# Patient Record
Sex: MALE | Race: WHITE | URBAN - METROPOLITAN AREA
[De-identification: names, ages, dates, MRNs, and addresses within clinical notes are randomized per-mention and may not be internally consistent; named-entity substitution may affect disease eponyms.]

---

## 2018-06-09 ENCOUNTER — APPOINTMENT (OUTPATIENT)
Dept: GENERAL RADIOLOGY | Age: 53
DRG: 923 | End: 2018-06-09
Attending: PHYSICIAN ASSISTANT
Payer: COMMERCIAL

## 2018-06-09 ENCOUNTER — HOSPITAL ENCOUNTER (INPATIENT)
Age: 53
LOS: 1 days | Discharge: HOME | DRG: 923 | End: 2018-06-10
Attending: EMERGENCY MEDICINE | Admitting: INTERNAL MEDICINE
Payer: COMMERCIAL

## 2018-06-09 DIAGNOSIS — T67.5XXA HEAT EXHAUSTION, INITIAL ENCOUNTER: Primary | ICD-10-CM

## 2018-06-09 DIAGNOSIS — M62.82 NON-TRAUMATIC RHABDOMYOLYSIS: ICD-10-CM

## 2018-06-09 PROBLEM — D72.829 LEUKOCYTOSIS: Status: ACTIVE | Noted: 2018-06-09

## 2018-06-09 PROBLEM — N17.9 AKI (ACUTE KIDNEY INJURY) (HCC): Status: ACTIVE | Noted: 2018-06-09

## 2018-06-09 LAB
ANION GAP SERPL CALC-SCNC: 11 MMOL/L (ref 5–15)
BASOPHILS # BLD: 0 K/UL (ref 0–0.1)
BASOPHILS NFR BLD: 0 % (ref 0–1)
BUN SERPL-MCNC: 20 MG/DL (ref 6–20)
BUN/CREAT SERPL: 11 (ref 12–20)
CALCIUM SERPL-MCNC: 9.9 MG/DL (ref 8.5–10.1)
CHLORIDE SERPL-SCNC: 104 MMOL/L (ref 97–108)
CK SERPL-CCNC: 2165 U/L (ref 39–308)
CO2 SERPL-SCNC: 25 MMOL/L (ref 21–32)
CREAT SERPL-MCNC: 1.78 MG/DL (ref 0.7–1.3)
CREAT UR-MCNC: 493.69 MG/DL
DIFFERENTIAL METHOD BLD: ABNORMAL
EOSINOPHIL # BLD: 0 K/UL (ref 0–0.4)
EOSINOPHIL NFR BLD: 0 % (ref 0–7)
ERYTHROCYTE [DISTWIDTH] IN BLOOD BY AUTOMATED COUNT: 12.8 % (ref 11.5–14.5)
GLUCOSE SERPL-MCNC: 86 MG/DL (ref 65–100)
HCT VFR BLD AUTO: 41 % (ref 36.6–50.3)
HGB BLD-MCNC: 14.2 G/DL (ref 12.1–17)
IMM GRANULOCYTES # BLD: 0 K/UL (ref 0–0.04)
IMM GRANULOCYTES NFR BLD AUTO: 0 % (ref 0–0.5)
LYMPHOCYTES # BLD: 1.3 K/UL (ref 0.8–3.5)
LYMPHOCYTES NFR BLD: 10 % (ref 12–49)
MAGNESIUM SERPL-MCNC: 2 MG/DL (ref 1.6–2.4)
MCH RBC QN AUTO: 30.1 PG (ref 26–34)
MCHC RBC AUTO-ENTMCNC: 34.6 G/DL (ref 30–36.5)
MCV RBC AUTO: 87 FL (ref 80–99)
MONOCYTES # BLD: 1.2 K/UL (ref 0–1)
MONOCYTES NFR BLD: 9 % (ref 5–13)
NEUTS SEG # BLD: 10.3 K/UL (ref 1.8–8)
NEUTS SEG NFR BLD: 80 % (ref 32–75)
NRBC # BLD: 0 K/UL (ref 0–0.01)
NRBC BLD-RTO: 0 PER 100 WBC
PLATELET # BLD AUTO: 261 K/UL (ref 150–400)
PMV BLD AUTO: 10.8 FL (ref 8.9–12.9)
POTASSIUM SERPL-SCNC: 3.6 MMOL/L (ref 3.5–5.1)
PROT UR-MCNC: 52 MG/DL (ref 0–11.9)
PROT/CREAT UR-RTO: 0.1
RBC # BLD AUTO: 4.71 M/UL (ref 4.1–5.7)
SODIUM SERPL-SCNC: 140 MMOL/L (ref 136–145)
SODIUM UR-SCNC: 74 MMOL/L
WBC # BLD AUTO: 12.9 K/UL (ref 4.1–11.1)

## 2018-06-09 PROCEDURE — 80048 BASIC METABOLIC PNL TOTAL CA: CPT | Performed by: PHYSICIAN ASSISTANT

## 2018-06-09 PROCEDURE — 93005 ELECTROCARDIOGRAM TRACING: CPT

## 2018-06-09 PROCEDURE — 84300 ASSAY OF URINE SODIUM: CPT | Performed by: INTERNAL MEDICINE

## 2018-06-09 PROCEDURE — 36415 COLL VENOUS BLD VENIPUNCTURE: CPT | Performed by: PHYSICIAN ASSISTANT

## 2018-06-09 PROCEDURE — 71046 X-RAY EXAM CHEST 2 VIEWS: CPT

## 2018-06-09 PROCEDURE — 96361 HYDRATE IV INFUSION ADD-ON: CPT

## 2018-06-09 PROCEDURE — 74011250636 HC RX REV CODE- 250/636: Performed by: PHYSICIAN ASSISTANT

## 2018-06-09 PROCEDURE — 85025 COMPLETE CBC W/AUTO DIFF WBC: CPT | Performed by: PHYSICIAN ASSISTANT

## 2018-06-09 PROCEDURE — 99285 EMERGENCY DEPT VISIT HI MDM: CPT

## 2018-06-09 PROCEDURE — 74011250636 HC RX REV CODE- 250/636: Performed by: INTERNAL MEDICINE

## 2018-06-09 PROCEDURE — 82550 ASSAY OF CK (CPK): CPT | Performed by: PHYSICIAN ASSISTANT

## 2018-06-09 PROCEDURE — 65270000029 HC RM PRIVATE

## 2018-06-09 PROCEDURE — 83735 ASSAY OF MAGNESIUM: CPT | Performed by: PHYSICIAN ASSISTANT

## 2018-06-09 PROCEDURE — 84156 ASSAY OF PROTEIN URINE: CPT | Performed by: INTERNAL MEDICINE

## 2018-06-09 PROCEDURE — 96360 HYDRATION IV INFUSION INIT: CPT

## 2018-06-09 RX ORDER — ACETAMINOPHEN 325 MG/1
650 TABLET ORAL
Status: DISCONTINUED | OUTPATIENT
Start: 2018-06-09 | End: 2018-06-10 | Stop reason: HOSPADM

## 2018-06-09 RX ORDER — SODIUM CHLORIDE, SODIUM LACTATE, POTASSIUM CHLORIDE, CALCIUM CHLORIDE 600; 310; 30; 20 MG/100ML; MG/100ML; MG/100ML; MG/100ML
200 INJECTION, SOLUTION INTRAVENOUS CONTINUOUS
Status: DISCONTINUED | OUTPATIENT
Start: 2018-06-09 | End: 2018-06-10 | Stop reason: HOSPADM

## 2018-06-09 RX ORDER — CHOLECALCIFEROL (VITAMIN D3) 125 MCG
200 CAPSULE ORAL
COMMUNITY
End: 2018-06-10

## 2018-06-09 RX ORDER — ZOLPIDEM TARTRATE 5 MG/1
5 TABLET ORAL
Status: DISCONTINUED | OUTPATIENT
Start: 2018-06-09 | End: 2018-06-10 | Stop reason: HOSPADM

## 2018-06-09 RX ORDER — ONDANSETRON 2 MG/ML
4 INJECTION INTRAMUSCULAR; INTRAVENOUS
Status: DISCONTINUED | OUTPATIENT
Start: 2018-06-09 | End: 2018-06-10 | Stop reason: HOSPADM

## 2018-06-09 RX ORDER — SODIUM CHLORIDE 0.9 % (FLUSH) 0.9 %
5-10 SYRINGE (ML) INJECTION EVERY 8 HOURS
Status: DISCONTINUED | OUTPATIENT
Start: 2018-06-09 | End: 2018-06-10 | Stop reason: HOSPADM

## 2018-06-09 RX ORDER — HEPARIN SODIUM 5000 [USP'U]/ML
5000 INJECTION, SOLUTION INTRAVENOUS; SUBCUTANEOUS EVERY 8 HOURS
Status: DISCONTINUED | OUTPATIENT
Start: 2018-06-09 | End: 2018-06-10 | Stop reason: HOSPADM

## 2018-06-09 RX ORDER — NALOXONE HYDROCHLORIDE 0.4 MG/ML
0.4 INJECTION, SOLUTION INTRAMUSCULAR; INTRAVENOUS; SUBCUTANEOUS AS NEEDED
Status: DISCONTINUED | OUTPATIENT
Start: 2018-06-09 | End: 2018-06-10 | Stop reason: HOSPADM

## 2018-06-09 RX ORDER — HYDROCODONE BITARTRATE AND ACETAMINOPHEN 5; 325 MG/1; MG/1
1 TABLET ORAL
Status: DISCONTINUED | OUTPATIENT
Start: 2018-06-09 | End: 2018-06-10 | Stop reason: HOSPADM

## 2018-06-09 RX ORDER — SODIUM CHLORIDE 0.9 % (FLUSH) 0.9 %
5-10 SYRINGE (ML) INJECTION AS NEEDED
Status: DISCONTINUED | OUTPATIENT
Start: 2018-06-09 | End: 2018-06-10 | Stop reason: HOSPADM

## 2018-06-09 RX ADMIN — SODIUM CHLORIDE, SODIUM LACTATE, POTASSIUM CHLORIDE, AND CALCIUM CHLORIDE 200 ML/HR: 600; 310; 30; 20 INJECTION, SOLUTION INTRAVENOUS at 20:02

## 2018-06-09 RX ADMIN — SODIUM CHLORIDE 1000 ML: 9 INJECTION, SOLUTION INTRAVENOUS at 15:24

## 2018-06-09 RX ADMIN — Medication 10 ML: at 22:14

## 2018-06-09 RX ADMIN — HEPARIN SODIUM 5000 UNITS: 5000 INJECTION, SOLUTION INTRAVENOUS; SUBCUTANEOUS at 22:13

## 2018-06-09 RX ADMIN — SODIUM CHLORIDE 1000 ML: 9 INJECTION, SOLUTION INTRAVENOUS at 16:38

## 2018-06-09 NOTE — IP AVS SNAPSHOT
[address] 
[phone] Patient: Leydi Butterfield MRN: [id number] HLT:[id number] A check tara indicates which time of day the medication should be taken. My Medications START taking these medications Instructions Each Dose to Equal  
 Morning Noon Evening Bedtime  
 acetaminophen 325 mg tablet Commonly known as:  TYLENOL Your next dose is: Take as needed Take 2 Tabs by mouth every six (6) hours as needed. 650 mg  
    
   
   
   
  
  
STOP taking these medications ALEVE 220 mg Cap Generic drug:  naproxen sodium Where to Get Your Medications Information on where to get these meds will be given to you by the nurse or doctor. ! Ask your nurse or doctor about these medications  
  acetaminophen 325 mg tablet

## 2018-06-09 NOTE — H&P
[facility]    Hospitalist Admission Note      NAME:  Alirio Sample   :   1965   MRN:  [id number]     PCP:  Dena Wills MD     Date/Time:  2018 7:48 PM          Subjective:     CHIEF COMPLAINT: Chills, muscle ache    HISTORY OF PRESENT ILLNESS:     Mr. Hao Cruz is a 50 y.o.  male with a hx of provoked PE who presented to the Emergency Department complaining of shaking chills, muscle aches, exhaustion. Patient has been partaking in a karate summit this weekend and admits to poor hydration, long hours, and warm environment. He reports that at approx. 1300 today, developed fatigue, shaking chills, and muscle aches. In ED, noted to have HEATHER and elevated CK. We will admit for further management. Past Medical History:   Diagnosis Date    Ill-defined condition     Pulmonary embolism        Past Surgical History:   Procedure Laterality Date    HX ORTHOPAEDIC Left     ACL repair    HX ORTHOPAEDIC Right     meniscectomy       Social History   Substance Use Topics    Smoking status: Never Smoker    Smokeless tobacco: Never Used    Alcohol use Yes      Comment: occasionally        History reviewed. No pertinent family history. Family hx cannot be fully assessed, due to the admitting conditions    Allergies   Allergen Reactions    Morphine Other (comments)     Morphine was given at [facility] and patient reports unable to breath and the sensation of weight/pressure on chest. Reports that he is able to tolerate all other similar pain medications however. Prior to Admission medications    Medication Sig Start Date End Date Taking? Authorizing Provider   naproxen sodium (ALEVE) 220 mg cap Take 200 mg by mouth two (2) times daily as needed.    Yes Phys MD Arnie       Review of Systems:  (bold if positive, if negative)    Gen:  chills, fatigueEyes:  ENT:  CVS:  Pulm:  GI:    :    MS:  Skin:  Psych:  Endo:    Hem:  Renal:    Neuro:        Objective:      VITALS: Vital signs reviewed; most recent are:    Visit Vitals    /90    Pulse (!) 57    Temp 98.2 °F (36.8 °C)    Resp (!) 6    Ht 6' (1.829 m)    Wt 88 kg (194 lb)    SpO2 99%    BMI 26.31 kg/m2     SpO2 Readings from Last 6 Encounters:   06/09/18 99%        No intake or output data in the 24 hours ending 06/09/18 1948     Exam:     Physical Exam:    Gen:  Well-developed, well-nourished, in no acute distress  HEENT:  Pink conjunctivae, PERRL, hearing intact to voice, dry mucous membranes  Neck:  Supple, without masses, thyroid non-tender  Resp:  No accessory muscle use, clear breath sounds without wheezes rales or rhonchi  Card:  No murmurs, normal S1, S2 without thrills, bruits or peripheral edema  Abd:  Soft, non-tender, non-distended, normoactive bowel sounds are present, no palpable organomegaly and no detectable hernias  Lymph:  No cervical or inguinal adenopathy  Musc:  No cyanosis or clubbing  Skin:  No rashes or ulcers, skin turgor is good  Neuro:  Cranial nerves are grossly intact, no focal motor weakness, follows commands appropriately  Psych:  Good insight, oriented to person, place and time, alert     Labs:    Recent Labs      06/09/18   1520   WBC  12.9*   HGB  14.2   HCT  41.0   PLT  261     Recent Labs      06/09/18   1520   NA  140   K  3.6   CL  104   CO2  25   GLU  86   BUN  20   CREA  1.78*   CA  9.9   MG  2.0     No results found for: GLUCPOC  No results for input(s): PH, PCO2, PO2, HCO3, FIO2 in the last 72 hours. No results for input(s): INR in the last 72 hours. No lab exists for component: INREXT  All Micro Results     Procedure Component Value Units Date/Time    URINE CULTURE HOLD SAMPLE [[id number]]     Order Status:  Canceled Specimen:  Urine           I have reviewed previous records       Assessment and Plan: Active Problems:    Rhabdomyolysis / HEATHER (acute kidney injury) / Heat exhaustion.  CK likely not high enough to cause renal dysfunction and HEATHER is likely IVVD related. Admit to medicine. Aggressive IVF hydration with LR. Check urine lytes. Check renal US. Follow CK. If SCr worsens, would involve nephrology but can hold off for now). Leukocytosis. Likely stress response. Hold on any abx for now. Montior    Hx of provoked pulmonary embolism. Following R knee surgery. Anticoagulated for 6 mos.  Not currently on PE       Telemetry reviewed:   Sinus bradycardia    Risk of deterioration: medium      Total time spent with patient: [address] discussed with: Patient, Nursing Staff and >50% of time spent in counseling and coordination of care    Discussed:  Care Plan and D/C Planning       ___________________________________________________    Attending Physician: Nelia Rinne, DO

## 2018-06-09 NOTE — ED PROVIDER NOTES
HPI Comments: 49 y/o male with no significant PMHx, presenting with complaint of dehydration. The patient states that he was practicing martial arts this afternoon in full protective gear when he began to feel \"overheated,\" with associated fatigue, shortness of breath, generalized weakness, leg cramps, and shaking. The shortness of breath has resolved, but he continues to feel weak and shaky. He reports having a cold earlier this week, but states his cough and congestion have been well controlled with mary seltzer cold/sinus. His leg cramps are rated 1/10, described as aching; no aggravating or alleviating factors. He denies fever, chest pain, leg swelling, abdominal pain, N/V/D, dysuria, or syncope. The history is provided by the patient. No past medical history on file. No past surgical history on file. No family history on file. Social History     Social History    Marital status: UNKNOWN     Spouse name: N/A    Number of children: N/A    Years of education: N/A     Occupational History    Not on file. Social History Main Topics    Smoking status: Not on file    Smokeless tobacco: Not on file    Alcohol use Not on file    Drug use: Not on file    Sexual activity: Not on file     Other Topics Concern    Not on file     Social History Narrative         ALLERGIES: Morphine    Review of Systems   Constitutional: Positive for chills, diaphoresis and fatigue. Negative for fever. HENT: Positive for congestion. Respiratory: Positive for cough and shortness of breath. Cardiovascular: Negative for chest pain and leg swelling. Gastrointestinal: Negative for abdominal pain, diarrhea, nausea and vomiting. Genitourinary: Negative for dysuria, frequency and urgency. Musculoskeletal: Positive for myalgias (leg cramps). Skin: Negative for rash. Neurological: Positive for weakness (generalized) and light-headedness. Negative for syncope and numbness.    All other systems reviewed and are negative. Vitals:    06/09/18 1500   BP: 137/80   Pulse: 79   Resp: 22   Temp: 98.2 °F (36.8 °C)   SpO2: 100%   Weight: 88 kg (194 lb)   Height: 6' (1.829 m)            Physical Exam   Constitutional: He is oriented to person, place, and time. He appears well-developed and well-nourished. No distress. HENT:   Head: Normocephalic and atraumatic. Eyes: Conjunctivae and EOM are normal.   Neck: Normal range of motion. Neck supple. Cardiovascular: Normal rate, regular rhythm and normal heart sounds. Pulmonary/Chest: Effort normal and breath sounds normal. No respiratory distress. He has no wheezes. Abdominal: Soft. He exhibits no distension. There is no tenderness. Musculoskeletal: Normal range of motion. No muscular or bony tenderness of lower extremities. No appreciable swelling. Neurological: He is alert and oriented to person, place, and time. Skin: Skin is warm and dry. He is not diaphoretic. Nursing note and vitals reviewed. MDM  Number of Diagnoses or Management Options  Heat exhaustion, initial encounter: new and requires workup  Non-traumatic rhabdomyolysis: new and requires workup     Amount and/or Complexity of Data Reviewed  Clinical lab tests: ordered and reviewed  Tests in the radiology section of CPT®: ordered and reviewed  Discuss the patient with other providers: yes (Dr. Osman Sykes, ED attending)  Independent visualization of images, tracings, or specimens: yes (CXR)    Patient Progress  Patient progress: stable        ED Course       Procedures        49 y/o male with no significant PMHx, presenting with complaint of dehydration. History and exam concerning for heat exhaustion, pneumonia, electrolyte abnormality, anemia or arrhythmia. Patient has already received 1L fluid bolus, will give additional 1L. ED EKG interpretation:  Rhythm: normal sinus rhythm; and regular . Rate (approx.): 73;  Axis: normal; ST/T wave: normal.  Interpreted by Dr. Osman Sykes, 3:39 PM     CXR, read by radiology and independently visualized and interpreted by myself, reveals no evidence of pneumonia or other acute cardiopulmonary abnormalities. Labs significant for Cr 1.78 with GFR 41, CK 2165, leukocytosis of 12.9. Will give additional 1L fluid bolus (for total of 3L). Safe for discharge home, with instructions for prompt PCP follow up in 2 days for repeat labs. The patient was instructed to rest and continue to hydrate with clear fluids at home. Strict ED return precautions discussed and provided in writing at time of discharge. The patient verbalized understanding and agreement with this plan. 1845: On assessment at the request of the RN, patient shaking and states he is not feeling well. Review of orders and plan of care indicates rhabdo with 3 liters infused. Discussed admission to hospital with Dr. Maria Eugenia Medrano.   1905: Dr. Suraj Gallego will admit to hospital for rhabdo, increase creatinine, IVF and repeat labs in am.

## 2018-06-09 NOTE — IP AVS SNAPSHOT
[address] [facility] 
 
 
 [address] [facility] 100[address] [facility] 
[phone] Patient: Brandon Fernandez MRN: [id number] MSA: About your hospitalization You were admitted on:  2018 You last received care in the:  SF 4M POST SURG ORT 2 You were discharged on:  Cami 10, 2018 Why you were hospitalized Your primary diagnosis was:  Not on File Your diagnoses also included:  Rhabdomyolysis, Rolando (Acute Kidney Injury) (Hcc), Heat Exhaustion, Leukocytosis Follow-up Information Follow up With Details Comments Contact Info Adelina Alonzo MD Schedule an appointment as soon as possible for a visit in 2 days For repeat labs [address] [facility] [address] Adelina Cook 7 [address] 
[phone] [facility]  If symptoms worsen [address] 50 [facility] 
[phone] Adelina Alonzo MD In 1 week repeat urinalysis and renal function and creatine kinase (muscle enzyme) 59 [facility] [address] Adelina Cook 7 [address] 
[phone] Discharge Orders None A check tara indicates which time of day the medication should be taken. My Medications START taking these medications Instructions Each Dose to Equal  
 Morning Noon Evening Bedtime  
 acetaminophen 325 mg tablet Commonly known as:  TYLENOL Your next dose is: Take as needed Take 2 Tabs by mouth every six (6) hours as needed. 650 mg  
    
   
   
   
  
  
STOP taking these medications ALEVE 220 mg Cap Generic drug:  naproxen sodium Where to Get Your Medications Information on where to get these meds will be given to you by the nurse or doctor. ! Ask your nurse or doctor about these medications  
  acetaminophen 325 mg tablet Discharge Instructions HOSPITALIST DISCHARGE INSTRUCTIONS 
NAME: Brandon Fernandez :  1965 MRN:  [id number] Date/Time:  6/10/2018 8:10 AM 
 
ADMIT DATE: 6/9/2018 DISCHARGE DATE: 6/10/2018 PRINCIPAL DISCHARGE DIAGNOSES: 
Rhabdomyolysis (muscle tissue injury) with acute kidney injury (resolved) MEDICATIONS: 
· It is important that you take the medication exactly as they are prescribed. Note the changes and additions to your medications. Be sure you understand these changes before you are discharged today. · Keep your medication in the bottles provided by the pharmacist and keep a list of the medication names, dosages, and times to be taken in your wallet. · Do not take other medications without consulting your doctor. Pain Management: per above medications What to do at [facility] Recommended diet:  Regular Diet with plenty of fluids Recommended activity: Activity as tolerated (nothing exertional for 2 weeks) If you experience any of the following symptoms then please call your primary care physician or return to the emergency room if you cannot get hold of your doctor: 
Fever, chills, severe muscle aches, cramping, weakness, severe chest pain, shortness of breath, or other severe concerning symptoms that brought you to the hospital in the first place Follow Up: Follow-up Information Follow up With Details Comments Contact Info Yashira Fitzgerald MD Schedule an appointment as soon as possible for a visit in 2 days For repeat labs 59 [facility] [address] Dr Yashira Cook 7 [address] 
[phone] [facility]  If symptoms worsen [address] 50 [facility] 
[phone] Yashira Fitzgerald MD In 1 week repeat urinalysis 59 [facility] [address] Dr Yashira Cook 7 [address] 
[phone] Information obtained by : 
I understand that if any problems occur once I am at home I am to contact my physician. I understand and acknowledge receipt of the instructions indicated above. Physician's or R.N.'s Signature                                                                  Date/Time Patient or Representative Signature                                                          Date/Time Heat Exhaustion: Care Instructions Your Care Instructions Heat exhaustion occurs when you are hot, sweat a lot, and do not drink enough to replace the lost fluids. Heat exhaustion is not the same as heatstroke, which is much more serious. Heatstroke can lead to problems with many different organs and can be life-threatening. After medical care for heat exhaustion, you will need to limit your activities and take good care of your body while it recovers. Follow-up care is a key part of your treatment and safety. Be sure to make and go to all appointments, and call your doctor if you are having problems. It's also a good idea to know your test results and keep a list of the medicines you take. How can you care for yourself at home? · Reduce your activities, and get plenty of rest. Your doctor will give you instructions on when you can resume your normal schedule. · Stay in a cool room for at least the next 24 hours. · Drink rehydration drinks, juices, and water to replace fluids. Drinks such as sports drinks that contain electrolytes work best, because they have salt and minerals. You need salt and minerals as well as water. You are drinking enough fluids when your urine is normal in color (light yellow or clear), and you are urinating every 2 to 4 hours. If you have kidney, heart, or liver disease and have to limit fluids or salt, talk with your doctor before you increase your fluid or salt intake. · Avoid drinks that have caffeine or alcohol. To prevent heat exhaustion · Drink plenty of fluids, enough so that your urine is light yellow or clear like water. If you have kidney, heart, or liver disease and have to limit fluids, talk with your doctor before you increase the amount of fluids you drink. · Drink plenty of water before, during, and after you are active. This is very important when it is hot out and when you do intense exercise. · During hot weather, wear light-colored clothing that fits loosely and a hat with a brim to reflect the sun. · Limit or avoid strenuous activity during hot or humid weather, especially during the hottest part of the day (10 a.m. to 4 p.m.). Heat exhaustion and heatstroke usually develop when you are working or exercising in hot weather. Humidity makes hot weather even more dangerous. · Cars can get very hot inside. Open the windows or turn on the air conditioning before you get in and close the doors. · Try to stay cool during hot weather. If your home is not air-conditioned, seek an air-conditioned place. That could be in [employer], a neighborhood café, or a friend's home. Fountain yourself with a cool mist. Take a cool shower, bath, or sponge bath. · Be aware that some medicines, such as major tranquilizers, can raise the risk of heat exhaustion. Ask your doctor whether any medicine you take raises your chance of getting heat exhaustion. When should you call for help? Call 911 anytime you think you may need emergency care. For example, call if: 
? · You feel very hot and: 
¨ You have a seizure. ¨ You feel confused. ¨ Your skin is red, hot, and dry. ¨ You passed out (lost consciousness). ?Call your doctor now or seek immediate medical care if: 
? · You cannot keep fluids down. ? · After returning to your normal activities, you have symptoms of heat exhaustion, such as sweating a lot, fatigue, dizziness, or nausea. ?Watch closely for changes in your health, and be sure to contact your doctor if: 
? · You do not get better as expected. Where can you learn more? Go to http://kaushik-angela.info/. Enter S222 in the search box to learn more about \"Heat Exhaustion: Care Instructions. \" Current as of: March 20, 2017 Content Version: 11.4 © 9440-5850 [employer]. Care instructions adapted under license by [employer] (which disclaims liability or warranty for this information). If you have questions about a medical condition or this instruction, always ask your healthcare professional. [place] [address] any warranty or liability for your use of this information. [employer] Announcement We are excited to announce that we are making your provider's discharge notes available to you in [employer]. You will see these notes when they are completed and signed by the physician that discharged you from your recent hospital stay. If you have any questions or concerns about any information you see in [employer], please call the Health Information Department where you were seen or reach out to your Primary Care Provider for more information about your plan of care. Introducing [facility]! [facility] introduces [employer] patient portal. Now you can access parts of your medical record, email your doctor's office, and request medication refills online. 1. In your internet browser, go to [email or web address] 2. Click on the First Time User? Click Here link in the Sign In box. You will see the New Member Sign Up page. 3. Enter your [employer] Access Code exactly as it appears below. You will not need to use this code after youve completed the sign-up process. If you do not sign up before the expiration date, you must request a new code. · [employer] Access Code: [facility] Expires: 9/7/2018  2:53 PM 
 
 4. Enter the last four digits of your Social Security Number (xxxx) and Date of Birth (mm/dd/yyyy) as indicated and click Submit. You will be taken to the next sign-up page. 5. Create a [employer] ID. This will be your [employer] login ID and cannot be changed, so think of one that is secure and easy to remember. 6. Create a [employer] password. You can change your password at any time. 7. Enter your Password Reset Question and Answer. This can be used at a later time if you forget your password. 8. Enter your e-mail address. You will receive e-mail notification when new information is available in [address] 9. Click Sign Up. You can now view and download portions of your medical record. 10. Click the Download Summary menu link to download a portable copy of your medical information. If you have questions, please visit the Frequently Asked Questions section of the [employer] website. Remember, [employer] is NOT to be used for urgent needs. For medical emergencies, dial 911. Now available from your iPhone and Android! Introducing Ruddy Marr As a Jacquelyn Nolan patient, I wanted to make you aware of our electronic visit tool called Ruddy Marr. Jacquelyn Nolan 24/7 allows you to connect within minutes with a medical provider 24 hours a day, seven days a week via a mobile device or tablet or logging into a secure website from your computer. You can access Ruddy Frazierfin from anywhere in the [place]. A virtual visit might be right for you when you have a simple condition and feel like you just dont want to get out of bed, or cant get away from work for an appointment, when your regular Jacquelyn Nolan provider is not available (evenings, weekends or holidays), or when youre out of town and need minor care.   Electronic visits cost only $49 and if the Ruddy Marr provider determines a prescription is needed to treat your condition, one can be electronically transmitted to a nearby pharmacy*. Please take a moment to enroll today if you have not already done so. The enrollment process is free and takes just a few minutes. To enroll, please download the [employer] 24/7 katie to your tablet or phone, or visit [email or web address] to enroll on your computer. And, as an [address] [place] [address] patient with a [employer] account, the results of your visits will be scanned into your electronic medical record and your primary care provider will be able to view the scanned results. We urge you to continue to see your regular [employer] provider for your ongoing medical care. And while your primary care provider may not be the one available when you seek a [employer] virtual visit, the peace of mind you get from getting a real diagnosis real time can be priceless. For more information on Company[employer], view our Frequently Asked Questions (FAQs) at [email or web address]. Sincerely, 
 
Marian Low MD 
Chief Medical Officer [address] [place] [address] *:  certain medications cannot be prescribed via [employer] Unresulted Labs-Please follow up with your PCP about these lab tests Order Current Status EKG, 12 LEAD, INITIAL Preliminary result Providers Seen During Your Hospitalization Provider Specialty Primary office phone No Carrasco MD Emergency Medicine [phone] Erendira Infante DO Internal Medicine [phone] Your Primary Care Physician (PCP) Primary Care Physician Office Phone Office Fax Tamanna Oropeza, [address] [facility] [address] [phone] You are allergic to the following Allergen Reactions Morphine Other (comments) Morphine was given at [facility] [place] and patient reports unable to breath and the sensation of weight/pressure on chest. Reports that he is able to tolerate all other similar pain medications however. Recent Documentation Height Weight BMI Smoking Status 1.829 m 88 kg 26.31 kg/m2 Never Smoker Emergency Contacts Name Discharge Info Relation Home Work Mobile RicoDoris DISCHARGE CAREGIVER [3] Spouse [3] Patient Belongings The following personal items are in your possession at time of discharge: 
     Visual Aid: None Please provide this summary of care documentation to your next provider. Signatures-by signing, you are acknowledging that this After Visit Summary has been reviewed with you and you have received a copy. Patient Signature:  ____________________________________________________________ Date:  ____________________________________________________________  
  
[facility] Provider Signature:  ____________________________________________________________ Date:  ____________________________________________________________

## 2018-06-09 NOTE — ED NOTES
Bedside and Verbal shift change report given to Hood Covarrubias RN (oncoming nurse) by Gayatri Gonzalez RN (offgoing nurse). Report included the following information SBAR, Kardex, ED Summary, Procedure Summary, Intake/Output, MAR, Recent Results and Med Rec Status.

## 2018-06-09 NOTE — PROGRESS NOTES
[facility]: MED RECONCILIATION    Comments:   Patient reports no prescription medications, but does take Aleve PRN (last dose this morning)   Reviewed morphine allergy with patient. He reports that he was given morphine at [facility] some time ago and that he had difficulty breathing and the sensation of weight/pressure on his chest. States that he can tolerate \"all other\" similar pain medications however. Unable to confirm exactly which ones he has had. Information obtained from: patient    Significant PMH/Disease States:   Past Medical History:   Diagnosis Date    Ill-defined condition 2005    Pulmonary embolism     Chief Complaint for this Admission:   Chief Complaint   Patient presents with    Dehydration     Allergies: Morphine    Prior to Admission Medications:     Medication Documentation Review Audit       Reviewed by BRANDYN ChampionD (Pharmacist) on 06/09/18 at [address] Provider Last Dose Status Taking?      naproxen sodium (ALEVE) 220 mg cap Take 200 mg by mouth two (2) times daily as needed. Phys Arnie, MD 6/9/2018 0630 Active Yes                  Thank you for the consult,  Davian Fischer

## 2018-06-09 NOTE — ED NOTES
The patient was able to stand to void but appeared very shaky and complained of bilateral lower leg discomfort.

## 2018-06-10 ENCOUNTER — APPOINTMENT (OUTPATIENT)
Dept: ULTRASOUND IMAGING | Age: 53
DRG: 923 | End: 2018-06-10
Attending: INTERNAL MEDICINE
Payer: COMMERCIAL

## 2018-06-10 VITALS
HEIGHT: 72 IN | RESPIRATION RATE: 15 BRPM | TEMPERATURE: 98 F | SYSTOLIC BLOOD PRESSURE: 118 MMHG | BODY MASS INDEX: 26.28 KG/M2 | HEART RATE: 57 BPM | WEIGHT: 194 LBS | OXYGEN SATURATION: 96 % | DIASTOLIC BLOOD PRESSURE: 66 MMHG

## 2018-06-10 LAB
ALBUMIN SERPL-MCNC: 3.1 G/DL (ref 3.5–5)
ALBUMIN/GLOB SERPL: 1.2 {RATIO} (ref 1.1–2.2)
ALP SERPL-CCNC: 49 U/L (ref 45–117)
ALT SERPL-CCNC: 35 U/L (ref 12–78)
ANION GAP SERPL CALC-SCNC: 8 MMOL/L (ref 5–15)
AST SERPL-CCNC: 67 U/L (ref 15–37)
BASOPHILS # BLD: 0 K/UL (ref 0–0.1)
BASOPHILS NFR BLD: 0 % (ref 0–1)
BILIRUB DIRECT SERPL-MCNC: 0.2 MG/DL (ref 0–0.2)
BILIRUB SERPL-MCNC: 0.9 MG/DL (ref 0.2–1)
BUN SERPL-MCNC: 14 MG/DL (ref 6–20)
BUN/CREAT SERPL: 13 (ref 12–20)
CALCIUM SERPL-MCNC: 8.2 MG/DL (ref 8.5–10.1)
CHLORIDE SERPL-SCNC: 106 MMOL/L (ref 97–108)
CK SERPL-CCNC: 1812 U/L (ref 39–308)
CO2 SERPL-SCNC: 25 MMOL/L (ref 21–32)
CREAT SERPL-MCNC: 1.04 MG/DL (ref 0.7–1.3)
DIFFERENTIAL METHOD BLD: ABNORMAL
EOSINOPHIL # BLD: 0.1 K/UL (ref 0–0.4)
EOSINOPHIL NFR BLD: 1 % (ref 0–7)
ERYTHROCYTE [DISTWIDTH] IN BLOOD BY AUTOMATED COUNT: 13.1 % (ref 11.5–14.5)
GLOBULIN SER CALC-MCNC: 2.5 G/DL (ref 2–4)
GLUCOSE SERPL-MCNC: 91 MG/DL (ref 65–100)
HCT VFR BLD AUTO: 36.2 % (ref 36.6–50.3)
HGB BLD-MCNC: 12 G/DL (ref 12.1–17)
IMM GRANULOCYTES # BLD: 0 K/UL (ref 0–0.04)
IMM GRANULOCYTES NFR BLD AUTO: 0 % (ref 0–0.5)
LYMPHOCYTES # BLD: 2.1 K/UL (ref 0.8–3.5)
LYMPHOCYTES NFR BLD: 34 % (ref 12–49)
MAGNESIUM SERPL-MCNC: 2 MG/DL (ref 1.6–2.4)
MCH RBC QN AUTO: 29.9 PG (ref 26–34)
MCHC RBC AUTO-ENTMCNC: 33.1 G/DL (ref 30–36.5)
MCV RBC AUTO: 90 FL (ref 80–99)
MONOCYTES # BLD: 0.6 K/UL (ref 0–1)
MONOCYTES NFR BLD: 10 % (ref 5–13)
NEUTS SEG # BLD: 3.4 K/UL (ref 1.8–8)
NEUTS SEG NFR BLD: 55 % (ref 32–75)
NRBC # BLD: 0 K/UL (ref 0–0.01)
NRBC BLD-RTO: 0 PER 100 WBC
PHOSPHATE SERPL-MCNC: 3.8 MG/DL (ref 2.6–4.7)
PLATELET # BLD AUTO: 194 K/UL (ref 150–400)
PMV BLD AUTO: 10.7 FL (ref 8.9–12.9)
POTASSIUM SERPL-SCNC: 3.4 MMOL/L (ref 3.5–5.1)
PROT SERPL-MCNC: 5.6 G/DL (ref 6.4–8.2)
RBC # BLD AUTO: 4.02 M/UL (ref 4.1–5.7)
SODIUM SERPL-SCNC: 139 MMOL/L (ref 136–145)
WBC # BLD AUTO: 6.2 K/UL (ref 4.1–11.1)

## 2018-06-10 PROCEDURE — 74011250636 HC RX REV CODE- 250/636: Performed by: INTERNAL MEDICINE

## 2018-06-10 PROCEDURE — 80048 BASIC METABOLIC PNL TOTAL CA: CPT | Performed by: INTERNAL MEDICINE

## 2018-06-10 PROCEDURE — 84100 ASSAY OF PHOSPHORUS: CPT | Performed by: INTERNAL MEDICINE

## 2018-06-10 PROCEDURE — 74011250637 HC RX REV CODE- 250/637: Performed by: INTERNAL MEDICINE

## 2018-06-10 PROCEDURE — 82550 ASSAY OF CK (CPK): CPT | Performed by: INTERNAL MEDICINE

## 2018-06-10 PROCEDURE — 36415 COLL VENOUS BLD VENIPUNCTURE: CPT | Performed by: INTERNAL MEDICINE

## 2018-06-10 PROCEDURE — 76770 US EXAM ABDO BACK WALL COMP: CPT

## 2018-06-10 PROCEDURE — 80076 HEPATIC FUNCTION PANEL: CPT | Performed by: INTERNAL MEDICINE

## 2018-06-10 PROCEDURE — 85025 COMPLETE CBC W/AUTO DIFF WBC: CPT | Performed by: INTERNAL MEDICINE

## 2018-06-10 PROCEDURE — 83735 ASSAY OF MAGNESIUM: CPT | Performed by: INTERNAL MEDICINE

## 2018-06-10 RX ORDER — POTASSIUM CHLORIDE 750 MG/1
20 TABLET, FILM COATED, EXTENDED RELEASE ORAL
Status: COMPLETED | OUTPATIENT
Start: 2018-06-10 | End: 2018-06-10

## 2018-06-10 RX ORDER — ACETAMINOPHEN 325 MG/1
650 TABLET ORAL
Qty: 30 TAB | Refills: 0 | Status: SHIPPED | OUTPATIENT
Start: 2018-06-10

## 2018-06-10 RX ADMIN — POTASSIUM CHLORIDE 20 MEQ: 750 TABLET, FILM COATED, EXTENDED RELEASE ORAL at 08:48

## 2018-06-10 RX ADMIN — SODIUM CHLORIDE, SODIUM LACTATE, POTASSIUM CHLORIDE, AND CALCIUM CHLORIDE 200 ML/HR: 600; 310; 30; 20 INJECTION, SOLUTION INTRAVENOUS at 02:59

## 2018-06-10 NOTE — PROGRESS NOTES
I have reviewed discharge instructions with the patient and spouse. .  The patient and spouse verbalized understanding.

## 2018-06-10 NOTE — DISCHARGE SUMMARY
Physician Discharge Summary     Patient ID:  Yaya Ch  [id number]  50 y.o.  1965    Admit date: 6/9/2018    Discharge date: 6/10/2018    Admission Diagnoses: Rhabdomyolysis    Principal Discharge Diagnoses: HEATHER  rhabdomyolysis    OTHER PROBLEMS ADDRESSEDS  Principal Diagnosis <principal problem not specified>                                            Active Problems:    Rhabdomyolysis (6/9/2018)      HEATHER (acute kidney injury) ([facility] [address] (6/9/2018)      Heat exhaustion (6/9/2018)      Leukocytosis (6/9/2018)       Patient Active Problem List   Diagnosis Code    Rhabdomyolysis M62.82    HEATHER (acute kidney injury) ([facility] [address] N17.9    Heat exhaustion T67. 5XXA    Leukocytosis [id number]         Hospital Course:   Mr. Kathy Perez is a 51 yo WM otherwise healthy presenting with myalgia, weakness, lethargy, found to have rhabdomyolysis and HEATHER     Rhabdomyolysis / HEATHER (acute kidney injury) / Heat exhaustion: CK elevated on admission, improved at discharge. Cr improved quickly after aggressive IV hydration and normalized. Renal US normal, ruling out hydronephrosis. Pt had profound weakness on admission, now feeling much better, improved faster than expected, and ambulating without problem. Wishes to go home today and he is stable to do so. Recommend following up with PCP next week to repeat labs, and urine (proteinuria). Instructed to avoid NSAIDS and no exertional activities for 2 weeks    Leukocytosis: normalized after supportive care. No fevers or chills to suggest infection     Hx of provoked pulmonary embolism. Following R knee surgery. Anticoagulated for 6 mos. 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 Offered LE dopplers but pt declined. Very low suspicion of DVT. Return precautions and red flags reviewed        Pt discharged in improved and stable condition.      Procedures performed: see above    Imaging studies: CXR - no acute process    PCP: Fermin Shah MD    Consults: None    Discharge Exam:  Patient Vitals for the past 12 hrs:   Temp Pulse Resp BP SpO2   06/10/18 0721 98 °F (36.7 °C) (!) 57 15 118/66 96 %     GEN: NAD  CV: RRR, NO MRG  RESP: CTAB  MSK: no calf tenderness on palpation      Disposition: home    Patient Instructions:   Current Discharge Medication List      START taking these medications    Details   acetaminophen (TYLENOL) 325 mg tablet Take 2 Tabs by mouth every six (6) hours as needed. Qty: 30 Tab, Refills: 0         STOP taking these medications       naproxen sodium (ALEVE) 220 mg cap Comments:   Reason for Stopping:               Activity: See discharge instructions  Diet: See discharge instructions  Wound Care: See discharge instructions    Follow-up Information     Follow up With Details Comments [address] Randall Rincon MD Schedule an appointment as soon as possible for a visit in 2 days For repeat labs [address]  [phone]      [facility]  If symptoms worsen [address]    Amina Hope MD In 1 week repeat urinalysis and renal function and creatine kinase (muscle enzyme) [address]  [phone]            I spent 35 minutes on this discharge.     Signed:  Yumi Virgen MD  6/10/2018  8:13 AM

## 2018-06-10 NOTE — ROUTINE PROCESS
TRANSFER - OUT REPORT:    Verbal report given to [address] [facility] on Ervin Roldan  being transferred to  for routine progression of care       Report consisted of patients Situation, Background, Assessment and   Recommendations(SBAR). Information from the following report(s) SBAR, ED Summary, [facility] and Recent Results was reviewed with the receiving nurse. Opportunity for questions and clarification was provided.

## 2018-06-10 NOTE — DISCHARGE INSTRUCTIONS
HOSPITALIST DISCHARGE INSTRUCTIONS  NAME: Maria Dolores Marrero   :  1965   MRN:  [id number]     Date/Time:  6/10/2018 8:10 AM    ADMIT DATE: 2018     DISCHARGE DATE: 6/10/2018     PRINCIPAL DISCHARGE DIAGNOSES:  Rhabdomyolysis (muscle tissue injury) with acute kidney injury (resolved)    MEDICATIONS:  · It is important that you take the medication exactly as they are prescribed. Note the changes and additions to your medications. Be sure you understand these changes before you are discharged today. · Keep your medication in the bottles provided by the pharmacist and keep a list of the medication names, dosages, and times to be taken in your wallet. · Do not take other medications without consulting your doctor. Pain Management: per above medications    What to do at Home    Recommended diet:  Regular Diet with plenty of fluids    Recommended activity: Activity as tolerated (nothing exertional for 2 weeks)    If you experience any of the following symptoms then please call your primary care physician or return to the emergency room if you cannot get hold of your doctor:  Fever, chills, severe muscle aches, cramping, weakness, severe chest pain, shortness of breath, or other severe concerning symptoms that brought you to the hospital in the first place    Follow Up: Follow-up Information     Follow up With Details Comments [id number] Randall Rincon MD Schedule an appointment as soon as possible for a visit in 2 days For repeat labs [address] [facility]  [phone]      [facility] EM[facility]Y [facility]PT  If symptoms worsen [address]    Vignesh Spring MD In 1 week repeat urinalysis [address] [place] [address]  [phone]              Information obtained by :  I understand that if any problems occur once I am at home I am to contact my physician.     I understand and acknowledge receipt of the instructions indicated above. Physician's or R.N.'s Signature                                                                  Date/Time                                                                                                                                              Patient or Representative Signature                                                          Date/Time       Heat Exhaustion: Care Instructions  Your Care Instructions  Heat exhaustion occurs when you are hot, sweat a lot, and do not drink enough to replace the lost fluids. Heat exhaustion is not the same as heatstroke, which is much more serious. Heatstroke can lead to problems with many different organs and can be life-threatening. After medical care for heat exhaustion, you will need to limit your activities and take good care of your body while it recovers. Follow-up care is a key part of your treatment and safety. Be sure to make and go to all appointments, and call your doctor if you are having problems. It's also a good idea to know your test results and keep a list of the medicines you take. How can you care for yourself at home? · Reduce your activities, and get plenty of rest. Your doctor will give you instructions on when you can resume your normal schedule. · Stay in a cool room for at least the next 24 hours. · Drink rehydration drinks, juices, and water to replace fluids. Drinks such as sports drinks that contain electrolytes work best, because they have salt and minerals. You need salt and minerals as well as water. You are drinking enough fluids when your urine is normal in color (light yellow or clear), and you are urinating every 2 to 4 hours.  If you have kidney, heart, or liver disease and have to limit fluids or salt, talk with your doctor before you increase your fluid or salt intake. · Avoid drinks that have caffeine or alcohol. To prevent heat exhaustion  · Drink plenty of fluids, enough so that your urine is light yellow or clear like water. If you have kidney, heart, or liver disease and have to limit fluids, talk with your doctor before you increase the amount of fluids you drink. · Drink plenty of water before, during, and after you are active. This is very important when it is hot out and when you do intense exercise. · During hot weather, wear light-colored clothing that fits loosely and a hat with a brim to reflect the sun. · Limit or avoid strenuous activity during hot or humid weather, especially during the hottest part of the day (10 a.m. to 4 p.m.). Heat exhaustion and heatstroke usually develop when you are working or exercising in hot weather. Humidity makes hot weather even more dangerous. · Cars can get very hot inside. Open the windows or turn on the air conditioning before you get in and close the doors. · Try to stay cool during hot weather. If your home is not air-conditioned, seek an air-conditioned place. That could be in [employer], a neighborhood café, or a friend's home. [place] yourself with a cool mist. Take a cool shower, bath, or sponge bath. · Be aware that some medicines, such as major tranquilizers, can raise the risk of heat exhaustion. Ask your doctor whether any medicine you take raises your chance of getting heat exhaustion. When should you call for help? Call 911 anytime you think you may need emergency care. For example, call if:  ? · You feel very hot and:  ¨ You have a seizure. ¨ You feel confused. ¨ Your skin is red, hot, and dry. ¨ You passed out (lost consciousness). ?Call your doctor now or seek immediate medical care if:  ? · You cannot keep fluids down.    ? · After returning to your normal activities, you have symptoms of heat exhaustion, such as sweating a lot, fatigue, dizziness, or nausea. ? Watch closely for changes in your health, and be sure to contact your doctor if:  ? · You do not get better as expected. Where can you learn more? Go to http://kaushik-angela.info/. Enter S222 in the search box to learn more about \"Heat Exhaustion: Care Instructions. \"  Current as of: March 20, 2017  Content Version: 11.4  © 0340-2879 "[employer]". Care instructions adapted under license by [employer] (which disclaims liability or warranty for this information). If you have questions about a medical condition or this instruction, always ask your healthcare professional. [place] [address] any warranty or liability for your use of this information.

## 2018-06-11 LAB
ATRIAL RATE: 73 BPM
CALCULATED P AXIS, ECG09: 33 DEGREES
CALCULATED R AXIS, ECG10: 12 DEGREES
CALCULATED T AXIS, ECG11: 49 DEGREES
DIAGNOSIS, 93000: NORMAL
P-R INTERVAL, ECG05: 142 MS
Q-T INTERVAL, ECG07: 410 MS
QRS DURATION, ECG06: 86 MS
QTC CALCULATION (BEZET), ECG08: 451 MS
VENTRICULAR RATE, ECG03: 73 BPM

## 2022-03-18 PROBLEM — M62.82 RHABDOMYOLYSIS: Status: ACTIVE | Noted: 2018-06-09

## 2022-03-18 PROBLEM — T67.5XXA HEAT EXHAUSTION: Status: ACTIVE | Noted: 2018-06-09

## 2022-03-19 PROBLEM — D72.829 LEUKOCYTOSIS: Status: ACTIVE | Noted: 2018-06-09

## 2022-03-19 PROBLEM — N17.9 AKI (ACUTE KIDNEY INJURY) (HCC): Status: ACTIVE | Noted: 2018-06-09

## 2024-11-27 ENCOUNTER — HOSPITAL ENCOUNTER (EMERGENCY)
Facility: HOSPITAL | Age: 59
Discharge: HOME | End: 2024-11-27
Attending: EMERGENCY MEDICINE
Payer: COMMERCIAL

## 2024-11-27 ENCOUNTER — OFFICE VISIT (OUTPATIENT)
Age: 59
End: 2024-11-27

## 2024-11-27 ENCOUNTER — APPOINTMENT (OUTPATIENT)
Facility: HOSPITAL | Age: 59
End: 2024-11-27
Payer: COMMERCIAL

## 2024-11-27 VITALS
BODY MASS INDEX: 28.1 KG/M2 | TEMPERATURE: 97.6 F | OXYGEN SATURATION: 97 % | DIASTOLIC BLOOD PRESSURE: 98 MMHG | SYSTOLIC BLOOD PRESSURE: 136 MMHG | RESPIRATION RATE: 16 BRPM | WEIGHT: 207.45 LBS | HEIGHT: 72 IN | HEART RATE: 79 BPM

## 2024-11-27 VITALS
TEMPERATURE: 98.4 F | SYSTOLIC BLOOD PRESSURE: 117 MMHG | WEIGHT: 207 LBS | HEIGHT: 72 IN | HEART RATE: 96 BPM | BODY MASS INDEX: 28.04 KG/M2 | RESPIRATION RATE: 16 BRPM | DIASTOLIC BLOOD PRESSURE: 85 MMHG | OXYGEN SATURATION: 95 %

## 2024-11-27 DIAGNOSIS — S61.211A LACERATION OF LEFT INDEX FINGER WITHOUT FOREIGN BODY WITHOUT DAMAGE TO NAIL, INITIAL ENCOUNTER: Primary | ICD-10-CM

## 2024-11-27 DIAGNOSIS — S62.639B OPEN FRACTURE OF TUFT OF DISTAL PHALANX OF FINGER: ICD-10-CM

## 2024-11-27 DIAGNOSIS — S62.668B: Primary | ICD-10-CM

## 2024-11-27 PROCEDURE — 6360000002 HC RX W HCPCS: Performed by: PHYSICIAN ASSISTANT

## 2024-11-27 PROCEDURE — 99283 EMERGENCY DEPT VISIT LOW MDM: CPT

## 2024-11-27 PROCEDURE — 6370000000 HC RX 637 (ALT 250 FOR IP): Performed by: PHYSICIAN ASSISTANT

## 2024-11-27 PROCEDURE — 73140 X-RAY EXAM OF FINGER(S): CPT

## 2024-11-27 PROCEDURE — 12002 RPR S/N/AX/GEN/TRNK2.6-7.5CM: CPT

## 2024-11-27 RX ORDER — GINSENG 100 MG
CAPSULE ORAL
Status: COMPLETED | OUTPATIENT
Start: 2024-11-27 | End: 2024-11-27

## 2024-11-27 RX ORDER — LIDOCAINE HYDROCHLORIDE AND EPINEPHRINE 10; 10 MG/ML; UG/ML
20 INJECTION, SOLUTION INFILTRATION; PERINEURAL
Status: COMPLETED | OUTPATIENT
Start: 2024-11-27 | End: 2024-11-27

## 2024-11-27 RX ORDER — CEPHALEXIN 500 MG/1
500 CAPSULE ORAL 3 TIMES DAILY
Qty: 30 CAPSULE | Refills: 0 | Status: SHIPPED | OUTPATIENT
Start: 2024-11-27 | End: 2024-12-07

## 2024-11-27 RX ADMIN — CEPHALEXIN 500 MG: 250 CAPSULE ORAL at 19:12

## 2024-11-27 RX ADMIN — LIDOCAINE HYDROCHLORIDE,EPINEPHRINE BITARTRATE 20 ML: 10; .01 INJECTION, SOLUTION INFILTRATION; PERINEURAL at 18:41

## 2024-11-27 RX ADMIN — BACITRACIN 1 EACH: 500 OINTMENT TOPICAL at 18:42

## 2024-11-27 ASSESSMENT — PAIN - FUNCTIONAL ASSESSMENT
PAIN_FUNCTIONAL_ASSESSMENT: ACTIVITIES ARE NOT PREVENTED
PAIN_FUNCTIONAL_ASSESSMENT: 0-10

## 2024-11-27 ASSESSMENT — PAIN DESCRIPTION - LOCATION: LOCATION: HAND;FINGER (COMMENT WHICH ONE)

## 2024-11-27 ASSESSMENT — PAIN DESCRIPTION - ORIENTATION: ORIENTATION: RIGHT

## 2024-11-27 ASSESSMENT — PAIN DESCRIPTION - PAIN TYPE: TYPE: ACUTE PAIN

## 2024-11-27 ASSESSMENT — PAIN DESCRIPTION - DESCRIPTORS: DESCRIPTORS: THROBBING

## 2024-11-27 ASSESSMENT — PAIN SCALES - GENERAL: PAINLEVEL_OUTOF10: 7

## 2024-11-27 NOTE — ED PROVIDER NOTES
EMERGENCY DEPARTMENT PHYSICIAN NOTE     Patient: Zoltan Rosado     Time of Service: 11/27/2024  6:17 PM     Chief complaint:   Chief Complaint   Patient presents with    Laceration        HISTORY:  Patient is a 59 y.o. male who presents to the emergency department with complaints of right fourth finger injury.  Just prior to arrival patient was moving a table and it flipped crushing his finger between the leg of the table and the wall.  Laceration to the right fourth digit.  No nail injury.  Bleeding controlled.  Tetanus up-to-date per patient.  No additional complaints.      No past medical history on file.     No past surgical history on file.     No family history on file.     Social History     Socioeconomic History    Marital status: Unknown   Tobacco Use    Smoking status: Never     Passive exposure: Never    Smokeless tobacco: Never   Substance and Sexual Activity    Alcohol use: Yes     Alcohol/week: 12.0 - 18.0 standard drinks of alcohol     Types: 12 - 18 Standard drinks or equivalent per week    Drug use: Not Currently        Current Medications: Reviewed in chart.    Allergies:   Allergies   Allergen Reactions    Morphine           REVIEW OF SYSTEMS: See HPI for pertinent positives and negatives.      PHYSICAL EXAM:  BP (!) 136/98   Pulse 79   Temp 97.6 °F (36.4 °C) (Oral)   Resp 16   Ht 1.829 m (6')   Wt 94.1 kg (207 lb 7.3 oz)   SpO2 97%   BMI 28.14 kg/m²    Physical Exam  Vitals and nursing note reviewed.   Constitutional:       General: He is not in acute distress.  HENT:      Head: Normocephalic and atraumatic.   Eyes:      Pupils: Pupils are equal, round, and reactive to light.   Cardiovascular:      Rate and Rhythm: Normal rate and regular rhythm.   Pulmonary:      Effort: Pulmonary effort is normal.   Abdominal:      General: Abdomen is flat. There is no distension.   Musculoskeletal:         General: Signs of injury present. Normal range of motion.        Hands:       Cervical back:

## 2024-11-27 NOTE — PROGRESS NOTES
Subjective     Chief Complaint   Patient presents with    Finger Injury     R hand ring finger was crushed between a heavy desk and wall. Happened today.        HPI 59-year-old male who presents for an injury to his right ring finger that was crushed between a desk and a wall that happened earlier today.    History reviewed. No pertinent past medical history.    History reviewed. No pertinent surgical history.    History reviewed. No pertinent family history.    Allergies   Allergen Reactions    Morphine        Social History     Tobacco Use    Smoking status: Never     Passive exposure: Never    Smokeless tobacco: Never   Substance Use Topics    Alcohol use: Yes     Alcohol/week: 12.0 - 18.0 standard drinks of alcohol     Types: 12 - 18 Standard drinks or equivalent per week    Drug use: Not Currently       Vitals:    11/27/24 1746   BP: 117/85   Pulse: 96   Resp: 16   Temp: 98.4 °F (36.9 °C)   SpO2: 95%       Review of Systems   Skin:  Positive for wound.       Objective     Physical Exam  Vitals reviewed.   Constitutional:       Appearance: Normal appearance.   Skin:     Comments: Right ring finger distally is a small laceration but there is bruising and what appears to be an open fracture   Neurological:      Mental Status: He is alert.     Possible open fracture he sent to ER for further care and treatment    Assessment & Plan     There are no diagnoses linked to this encounter.    No visits with results within 1 Day(s) from this visit.   Latest known visit with results is:   No results found for any previous visit.       Xray Result (most recent):  XR SHOULDER LEFT (MIN 2 VIEWS) 12/21/2023    Narrative  Grashey, Scapula Y, Axillary.    Impression  I ordered and interpreted three views left shoulder.  Very subtle  arthritic changes.  No acute findings      I have discussed the results, diagnosis and treatment plan with the patient.  The patient also understands that early in the process of an illness, an urgent

## 2024-11-27 NOTE — ED TRIAGE NOTES
Pt presents to ED reporting approximately 1 hour ago he smashed his R fourth finger in between a desk and steel wall. Tdap up to date.

## 2024-11-28 NOTE — DISCHARGE INSTRUCTIONS
APPLY ICE FOR PAIN/SWELLING.    APPLY ANTIBIOTIC OINTMENT 2-3 X DAY.     REMOVAL IN 10 DAYS.     FOLLOW UP IF ANY REDNESS/SWELLING/DRAINAGE OR SIGNS OF INFECTION.